# Patient Record
Sex: MALE | Race: BLACK OR AFRICAN AMERICAN | NOT HISPANIC OR LATINO | Employment: FULL TIME | URBAN - METROPOLITAN AREA
[De-identification: names, ages, dates, MRNs, and addresses within clinical notes are randomized per-mention and may not be internally consistent; named-entity substitution may affect disease eponyms.]

---

## 2018-02-23 ENCOUNTER — HOSPITAL ENCOUNTER (EMERGENCY)
Facility: HOSPITAL | Age: 23
Discharge: HOME | End: 2018-02-23
Attending: EMERGENCY MEDICINE

## 2018-02-23 VITALS
DIASTOLIC BLOOD PRESSURE: 65 MMHG | SYSTOLIC BLOOD PRESSURE: 107 MMHG | BODY MASS INDEX: 24.16 KG/M2 | RESPIRATION RATE: 16 BRPM | OXYGEN SATURATION: 99 % | WEIGHT: 145 LBS | TEMPERATURE: 98 F | HEART RATE: 78 BPM | HEIGHT: 65 IN

## 2018-02-23 DIAGNOSIS — Z20.2 TRICHOMONAS EXPOSURE: Primary | ICD-10-CM

## 2018-02-23 LAB
BILIRUB UR QL STRIP: NEGATIVE
CLARITY UR: ABNORMAL
COLOR UR: YELLOW
GLUCOSE UR QL STRIP: NEGATIVE
HGB UR QL STRIP: NEGATIVE
KETONES UR QL STRIP: NEGATIVE
LEUKOCYTE ESTERASE UR QL STRIP: NEGATIVE
NITRITE UR QL STRIP: NEGATIVE
PH UR STRIP: 6 [PH] (ref 5–8)
PROT UR QL STRIP: NEGATIVE
SP GR UR STRIP: 1.02 (ref 1–1.03)
URN SPEC COLLECT METH UR: ABNORMAL
UROBILINOGEN UR STRIP-ACNC: NEGATIVE EU/DL

## 2018-02-23 PROCEDURE — 25000003 PHARM REV CODE 250: Performed by: NURSE PRACTITIONER

## 2018-02-23 PROCEDURE — 96372 THER/PROPH/DIAG INJ SC/IM: CPT

## 2018-02-23 PROCEDURE — 87491 CHLMYD TRACH DNA AMP PROBE: CPT

## 2018-02-23 PROCEDURE — 81003 URINALYSIS AUTO W/O SCOPE: CPT

## 2018-02-23 PROCEDURE — 63600175 PHARM REV CODE 636 W HCPCS: Performed by: NURSE PRACTITIONER

## 2018-02-23 PROCEDURE — 99283 EMERGENCY DEPT VISIT LOW MDM: CPT | Mod: 25

## 2018-02-23 RX ORDER — CEFTRIAXONE 250 MG/1
250 INJECTION, POWDER, FOR SOLUTION INTRAMUSCULAR; INTRAVENOUS
Status: COMPLETED | OUTPATIENT
Start: 2018-02-23 | End: 2018-02-23

## 2018-02-23 RX ORDER — AZITHROMYCIN 250 MG/1
1000 TABLET, FILM COATED ORAL
Status: COMPLETED | OUTPATIENT
Start: 2018-02-23 | End: 2018-02-23

## 2018-02-23 RX ORDER — METRONIDAZOLE 500 MG/1
2000 TABLET ORAL
Status: COMPLETED | OUTPATIENT
Start: 2018-02-23 | End: 2018-02-23

## 2018-02-23 RX ORDER — ONDANSETRON 4 MG/1
4 TABLET, ORALLY DISINTEGRATING ORAL
Status: COMPLETED | OUTPATIENT
Start: 2018-02-23 | End: 2018-02-23

## 2018-02-23 RX ADMIN — METRONIDAZOLE 2000 MG: 500 TABLET ORAL at 10:02

## 2018-02-23 RX ADMIN — AZITHROMYCIN 1000 MG: 250 TABLET, FILM COATED ORAL at 10:02

## 2018-02-23 RX ADMIN — CEFTRIAXONE SODIUM 250 MG: 250 INJECTION, POWDER, FOR SOLUTION INTRAMUSCULAR; INTRAVENOUS at 10:02

## 2018-02-23 RX ADMIN — ONDANSETRON 4 MG: 4 TABLET, ORALLY DISINTEGRATING ORAL at 10:02

## 2018-02-24 NOTE — DISCHARGE INSTRUCTIONS
Please return to the ED for any new or worsening symptoms: Painful urination, painful lesions, loss of consciousness or any other concerns. Please follow up with primary care within in the week. You may also call [phone] for the [facility] same day appointment line.

## 2018-02-24 NOTE — ED PROVIDER NOTES
Encounter Date: 2/23/2018    SCRIBE #1 NOTE: I, Rahel Karrie, am scribing for, and in the presence of,  Tracy Reyes NP. I have scribed the following portions of the note - Other sections scribed: HPI and ROS.       History     Chief Complaint   Patient presents with    Exposure to STD     Pt stated he contacted STD from a partner. Wants to get himself checked. Denies symptoms     CC: Exposure to STD    HPI: The pt is a 22 y.o. M with a PMHx of asthma as a child and circumcision who presents to the ED for evaluation of exposure to STD. Pt reports that his girlfriend's urine recently came back positive for trichomoniasis and he wants to get testing and treatment done. Pt reports that he has had recent unprotected sex w/ girlfiend. He denies any hx of STDs as well as fever, chills, penile discharge, dysuria, and other associated symptoms.       The history is provided by the patient. No  was used.     Review of patient's allergies indicates:  No Known Allergies  Past Medical History:   Diagnosis Date    Asthma      Past Surgical History:   Procedure Laterality Date    TONSILLECTOMY       Family History   Problem Relation Age of Onset    Family history unknown: Yes     Social History   Substance Use Topics    Smoking status: Current Every Day Smoker     Types: Cigarettes    Smokeless tobacco: Never Used    Alcohol use Yes     Review of Systems   Constitutional: Negative for chills and fever.   HENT: Negative.    Eyes: Negative.    Respiratory: Negative.  Negative for cough.    Cardiovascular: Negative.    Gastrointestinal: Negative.  Negative for diarrhea, nausea and vomiting.   Genitourinary: Negative for discharge and dysuria.   Musculoskeletal: Negative.  Negative for joint swelling.   Skin: Negative.  Negative for rash.   Neurological: Negative.        Physical Exam     Initial Vitals [02/23/18 2120]   BP Pulse Resp Temp SpO2   128/73 63 16 98.1 °F (36.7 °C) 99 %      MAP       91.33          Physical Exam    Constitutional: Vital signs are normal. He appears well-developed and well-nourished.  Non-toxic appearance.   Eyes: EOM are normal.   Neck: Full passive range of motion without pain. Neck supple. No neck rigidity.   Cardiovascular: Normal rate, S1 normal, S2 normal and normal heart sounds. Exam reveals no gallop.    No murmur heard.  Pulmonary/Chest: Effort normal and breath sounds normal. No tachypnea. He has no decreased breath sounds. He has no wheezes. He has no rhonchi. He has no rales.   Genitourinary:   Genitourinary Comments: Deferred (asymptomatic)   Neurological: He is alert and oriented to person, place, and time. GCS eye subscore is 4. GCS verbal subscore is 5. GCS motor subscore is 6.   Skin: Skin is warm, dry and intact. No rash noted.   Psychiatric: He has a normal mood and affect.         ED Course   Procedures  Labs Reviewed   URINALYSIS - Abnormal; Notable for the following:        Result Value    Appearance, UA Hazy (*)     All other components within normal limits   C. TRACHOMATIS/N. GONORRHOEAE BY AMP DNA             Medical Decision Making:   ED Management:  This is a 22-year-old male who presents to the ED with complaints of Trichomonas exposure.  He is asymptomatic, afebrile and well-appearing.  Urinalysis unremarkable.  GC/Chlamydia pending.  Patient given azithromycin, Rocephin and Flagyl in the ED.  No indication for further testing.  Discharged home with instructions for supportive care and follow-up.  Return precautions given.  Case discussed with Dr. Núñez, who agrees with plan.            Scribe Attestation:   Scribe #1: I performed the above scribed service and the documentation accurately describes the services I performed. I attest to the accuracy of the note.    Attending Attestation:           Physician Attestation for Scribe:  Physician Attestation Statement for Scribe #1: I, Tracy Reyes NP, reviewed documentation, as scribed by Rahel Yoon in my presence,  and it is both accurate and complete.                    Clinical Impression:   The encounter diagnosis was Trichomonas exposure.    Disposition:   Disposition: Discharged  Condition: Stable                        Tracy Reyes NP  02/23/18 4007

## 2018-02-24 NOTE — ED TRIAGE NOTES
Pt states that he had sexual intercourse with a female a few days ago and denies any symptoms at this time.  Pt states that he did use a condom but wants to be checked for safe measure.

## 2018-02-25 LAB
C TRACH DNA SPEC QL NAA+PROBE: NOT DETECTED
N GONORRHOEA DNA SPEC QL NAA+PROBE: NOT DETECTED

## 2019-04-29 ENCOUNTER — HOSPITAL ENCOUNTER (EMERGENCY)
Facility: HOSPITAL | Age: 24
Discharge: HOME | End: 2019-04-29
Attending: EMERGENCY MEDICINE
Payer: MEDICAID

## 2019-04-29 VITALS
WEIGHT: 158 LBS | OXYGEN SATURATION: 98 % | DIASTOLIC BLOOD PRESSURE: 62 MMHG | TEMPERATURE: 99 F | HEART RATE: 85 BPM | HEIGHT: 66 IN | RESPIRATION RATE: 16 BRPM | BODY MASS INDEX: 25.39 KG/M2 | SYSTOLIC BLOOD PRESSURE: 121 MMHG

## 2019-04-29 DIAGNOSIS — Z71.1 FEARED COMPLAINT WITHOUT DIAGNOSIS: Primary | ICD-10-CM

## 2019-04-29 PROCEDURE — 99282 EMERGENCY DEPT VISIT SF MDM: CPT

## 2019-04-30 NOTE — ED PROVIDER NOTES
"Encounter Date: 4/29/2019       History     Chief Complaint   Patient presents with    Hand/foot swelling     "My right hand and right foot have been swelling, then going down for the past month."     Chief complaint:  Right hand and foot swelling    History of present illness:  Patient is a 23-year-old male who states that for 1 month he has had intermittent swelling of the right hand and foot that is generally painless although he reports pain currently is a 6/10.  He reports that currently there is no swelling present.  He denies alleviating factors but states that work is aggravating to the issue as he is often lifting things with the right hand and walking on the foot.  He denies radiating pain or that the pain is worse during the morning night or any particular time of day.  He denies fever, chills and all other complaints.    The history is provided by the patient and medical records. No  was used.     Review of patient's allergies indicates:   Allergen Reactions    Shellfish containing products Swelling     Past Medical History:   Diagnosis Date    Asthma      Past Surgical History:   Procedure Laterality Date    TONSILLECTOMY       Family History   Family history unknown: Yes     Social History     Tobacco Use    Smoking status: Current Every Day Smoker     Types: Cigarettes    Smokeless tobacco: Never Used   Substance Use Topics    Alcohol use: Yes    Drug use: Yes     Types: Marijuana     Review of Systems   Constitutional: Negative for appetite change, chills, diaphoresis, fatigue and fever.   HENT: Negative for congestion, ear discharge, ear pain, postnasal drip, rhinorrhea, sinus pressure, sneezing, sore throat and voice change.    Eyes: Negative for discharge, itching and visual disturbance.   Respiratory: Negative for cough, shortness of breath and wheezing.    Cardiovascular: Negative for chest pain, palpitations and leg swelling.   Gastrointestinal: Negative for " abdominal pain, nausea and vomiting.   Endocrine: Negative for polydipsia, polyphagia and polyuria.   Genitourinary: Negative for difficulty urinating, discharge, dysuria, frequency, hematuria, penile pain, penile swelling and urgency.   Musculoskeletal: Negative for arthralgias and myalgias.   Skin: Negative for rash and wound.   Neurological: Negative for dizziness, seizures, syncope and weakness.   Hematological: Negative for adenopathy. Does not bruise/bleed easily.   Psychiatric/Behavioral: Negative for agitation and self-injury. The patient is not nervous/anxious.        Physical Exam     Initial Vitals [04/29/19 1940]   BP Pulse Resp Temp SpO2   121/62 85 16 98.9 °F (37.2 °C) 98 %      MAP       --         Physical Exam    Nursing note and vitals reviewed.  Constitutional: He appears well-developed and well-nourished. He is not diaphoretic. No distress.   HENT:   Head: Normocephalic and atraumatic.   Right Ear: External ear normal.   Left Ear: External ear normal.   Nose: Nose normal.   Eyes: Pupils are equal, round, and reactive to light. Right eye exhibits no discharge. Left eye exhibits no discharge. No scleral icterus.   Neck: Normal range of motion.   Pulmonary/Chest: No respiratory distress.   Abdominal: He exhibits no distension.   Musculoskeletal: Normal range of motion.        Right hand: Normal.        Right foot: Normal.   Neurological: He is alert and oriented to person, place, and time.   Skin: Skin is dry. Capillary refill takes less than 2 seconds.         ED Course   Procedures  Labs Reviewed - No data to display       Imaging Results    None                APC / Resident Notes:   Initial assessment:  23-year-old male who reports right hand and foot swelling that is not present at this time.  He initially reported that it was painless but reports a 6/10 severity of pain.     Differential diagnosis includes CHF, rheumatoid or other autoimmune issue, infection    I offered the patient blood work  to check CBC, chemistry, ESR and CRP.  Patient declined this stating that he did not want to wait.  He would prefer to follow up with his primary care provider or return for any worsening or changes in condition.    Care of the patient discussed with Dr. AMANDO Augustine who agreed with the assessment and plan.                  Clinical Impression:       ICD-10-CM ICD-9-CM   1. Feared complaint without diagnosis Z71.1 V65.5         Disposition:   Disposition: Discharged  Condition: Stable                        Sachin Swartz, KIANNA  04/30/19 0136

## 2019-04-30 NOTE — ED TRIAGE NOTES
Pt. Reports right hand swollen and right foot swollen for the past month and a half. Pt. States his hand and foot ar not swollen at the present moment.

## 2019-04-30 NOTE — DISCHARGE INSTRUCTIONS
Return to the emergency department when hand or foot swells again or if there is pain, warmth or drainage . Return to the Emergency department for any worsening or failure to improve, otherwise follow up with your primary care provider.

## 2019-08-07 ENCOUNTER — HOSPITAL ENCOUNTER (EMERGENCY)
Facility: HOSPITAL | Age: 24
Discharge: HOME | End: 2019-08-07
Attending: EMERGENCY MEDICINE
Payer: MEDICAID

## 2019-08-07 VITALS
WEIGHT: 152 LBS | OXYGEN SATURATION: 98 % | SYSTOLIC BLOOD PRESSURE: 104 MMHG | RESPIRATION RATE: 16 BRPM | BODY MASS INDEX: 23.04 KG/M2 | TEMPERATURE: 98 F | DIASTOLIC BLOOD PRESSURE: 65 MMHG | HEIGHT: 68 IN | HEART RATE: 56 BPM

## 2019-08-07 DIAGNOSIS — S61.215A LACERATION OF LEFT RING FINGER WITHOUT FOREIGN BODY WITHOUT DAMAGE TO NAIL, INITIAL ENCOUNTER: Primary | ICD-10-CM

## 2019-08-07 PROCEDURE — 12002 RPR S/N/AX/GEN/TRNK2.6-7.5CM: CPT | Mod: F3

## 2019-08-07 PROCEDURE — 63600175 PHARM REV CODE 636 W HCPCS: Performed by: NURSE PRACTITIONER

## 2019-08-07 PROCEDURE — 25000003 PHARM REV CODE 250: Performed by: NURSE PRACTITIONER

## 2019-08-07 PROCEDURE — 99284 EMERGENCY DEPT VISIT MOD MDM: CPT | Mod: 25

## 2019-08-07 PROCEDURE — 90471 IMMUNIZATION ADMIN: CPT | Performed by: NURSE PRACTITIONER

## 2019-08-07 PROCEDURE — 90715 TDAP VACCINE 7 YRS/> IM: CPT | Performed by: NURSE PRACTITIONER

## 2019-08-07 RX ORDER — MAG HYDROX/ALUMINUM HYD/SIMETH 200-200-20
30 SUSPENSION, ORAL (FINAL DOSE FORM) ORAL
Status: COMPLETED | OUTPATIENT
Start: 2019-08-07 | End: 2019-08-07

## 2019-08-07 RX ORDER — IBUPROFEN 400 MG/1
800 TABLET ORAL
Status: COMPLETED | OUTPATIENT
Start: 2019-08-07 | End: 2019-08-07

## 2019-08-07 RX ORDER — LIDOCAINE HYDROCHLORIDE 10 MG/ML
10 INJECTION INFILTRATION; PERINEURAL
Status: COMPLETED | OUTPATIENT
Start: 2019-08-07 | End: 2019-08-07

## 2019-08-07 RX ADMIN — IBUPROFEN 800 MG: 400 TABLET, FILM COATED ORAL at 04:08

## 2019-08-07 RX ADMIN — CLOSTRIDIUM TETANI TOXOID ANTIGEN (FORMALDEHYDE INACTIVATED), CORYNEBACTERIUM DIPHTHERIAE TOXOID ANTIGEN (FORMALDEHYDE INACTIVATED), BORDETELLA PERTUSSIS TOXOID ANTIGEN (GLUTARALDEHYDE INACTIVATED), BORDETELLA PERTUSSIS FILAMENTOUS HEMAGGLUTININ ANTIGEN (FORMALDEHYDE INACTIVATED), BORDETELLA PERTUSSIS PERTACTIN ANTIGEN, AND BORDETELLA PERTUSSIS FIMBRIAE 2/3 ANTIGEN 0.5 ML: 5; 2; 2.5; 5; 3; 5 INJECTION, SUSPENSION INTRAMUSCULAR at 04:08

## 2019-08-07 RX ADMIN — ALUMINUM HYDROXIDE, MAGNESIUM HYDROXIDE, AND SIMETHICONE 30 ML: 200; 200; 20 SUSPENSION ORAL at 04:08

## 2019-08-07 RX ADMIN — BACITRACIN, NEOMYCIN, POLYMYXIN B 2 EACH: 400; 3.5; 5 OINTMENT TOPICAL at 04:08

## 2019-08-07 RX ADMIN — LIDOCAINE HYDROCHLORIDE 10 ML: 10 INJECTION, SOLUTION INFILTRATION; PERINEURAL at 04:08

## 2019-08-07 NOTE — ED PROVIDER NOTES
Encounter Date: 8/7/2019    SCRIBE #1 NOTE: I, Felisa Bae, am scribing for, and in the presence of,  ROBIN Clark. I have scribed the following portions of the note - Other sections scribed: HPI, ROS.       History     Chief Complaint   Patient presents with    Laceration     Laceration to left fourth finger yesterday. Pt appears drowsy. VSS. Answers all questions appropriately. Follows commands      CC: Laceration    HPI: This 24 y.o male presents to the ED for an evaluation for a laceration to the left ring finger that occurred on yesterday evening.  Patient reports moving furniture, when he mistakenly cut his finger on the jagged edge of a table top.  He reports having pain to the left ring finger near the laceration that is worse with movement.  He states he attempted to be evaluated last night at this facility, but reports he left due to the extensive wait time.  He currently denies fever, finger swelling, extremity numbness, extremity weakness, wrist pain, hand pain, or any other associated symptoms.  No prior tx.  No alleviating factors.  He reports his last tetanus vaccination being greater than 10 years.     The history is provided by the patient. No  was used.     Review of patient's allergies indicates:   Allergen Reactions    Shellfish containing products Swelling     Past Medical History:   Diagnosis Date    Asthma      Past Surgical History:   Procedure Laterality Date    TONSILLECTOMY       Family History   Family history unknown: Yes     Social History     Tobacco Use    Smoking status: Current Every Day Smoker     Packs/day: 1.00     Types: Cigarettes    Smokeless tobacco: Never Used   Substance Use Topics    Alcohol use: Yes    Drug use: Yes     Types: Marijuana     Comment: reports he is currently on probation     Review of Systems   Constitutional: Negative for chills and fever.   HENT: Negative for congestion and sore throat.    Eyes: Negative for visual  disturbance.   Respiratory: Negative for cough and shortness of breath.    Cardiovascular: Negative for chest pain.   Gastrointestinal: Negative for abdominal pain, nausea and vomiting.   Genitourinary: Negative for dysuria.   Musculoskeletal: Positive for arthralgias (left ring finger). Negative for joint swelling.   Skin: Positive for wound. Negative for rash.   Neurological: Negative for weakness, numbness and headaches.       Physical Exam     Initial Vitals [08/07/19 1514]   BP Pulse Resp Temp SpO2   (!) 106/48 70 18 97.6 °F (36.4 °C) 99 %      MAP       --         Physical Exam    Nursing note and vitals reviewed.  Constitutional: Vital signs are normal. He appears well-developed and well-nourished. He is not diaphoretic. He is active and cooperative.  Non-toxic appearance. No distress.   Musculoskeletal:        Hands:  Neurological: He is alert.   Skin: Skin is warm and dry. Capillary refill takes less than 2 seconds.   Psychiatric: He has a normal mood and affect.         ED Course   Lac Repair  Date/Time: 8/7/2019 4:00 PM  Performed by: Mabel Diaz NP  Authorized by: Norma Dallas MD   Body area: upper extremity  Location details: left ring finger  Laceration length: 3.5 cm  Foreign bodies: no foreign bodies  Tendon involvement: none  Nerve involvement: none  Vascular damage: no  Anesthesia: local infiltration    Anesthesia:  Local Anesthetic: lidocaine 1% without epinephrine  Patient sedated: no  Preparation: Patient was prepped and draped in the usual sterile fashion.  Irrigation solution: saline  Irrigation method: syringe  Amount of cleaning: standard  Debridement: none  Degree of undermining: none  Skin closure: 4-0 nylon  Number of sutures: 7  Technique: simple  Approximation: close  Approximation difficulty: simple  Dressing: antibiotic ointment  Patient tolerance: Patient tolerated the procedure well with no immediate complications        Labs Reviewed - No data to display       Imaging  Results    None          Medical Decision Making:   ED Management:  This is an urgent evaluation of a 24-year-old male that presents to the emergency room with a laceration to his left ring finger.  The laceration was repaired without difficulty.  There are no signs of foreign body, neurovascular deficit, or infection at this time. To follow up with PMD/ER in 7-10 for suture removal.  Given wound care instructions. Return precautions.              Scribe Attestation:   Scribe #1: I performed the above scribed service and the documentation accurately describes the services I performed. I attest to the accuracy of the note.    I, Mabel Diaz, personally performed the services described in this documentation. All medical record entries made by the scribe were at my direction and in my presence.  I have reviewed the chart and agree that the record reflects my personal performance and is accurate and complete.           Clinical Impression:       ICD-10-CM ICD-9-CM   1. Laceration of left ring finger without foreign body without damage to nail, initial encounter S61.215A 883.0         Disposition:   Disposition: Discharged  Condition: Stable                        Mabel Diaz NP  08/07/19 0389

## 2019-08-07 NOTE — DISCHARGE INSTRUCTIONS
Please keep the wound clean and dry.  Wash normally with soap and water, taking care not to disrupt the sutures.  Apply a thin layer of antibiotic ointment over the area (bacitracin, neosporin).  You can use a moist cotton Q-tip to push away any crusting that builds up around the sutures.  Keep a clean gauze dressing over the wound and change when soiled, or at least once daily.    Monitor for any signs of infection: redness, swelling, pus or foul discharge, or fever greater than 100.4F.    Take tylenol or ibuprofen for pain, as needed.    Follow up with your primary care doctor or return to the ER in 7-10 days for wound check and suture removal.    Return to the ER for any new or worsening symptoms or concerns.

## 2019-08-07 NOTE — ED TRIAGE NOTES
Pt reports he cut his left 4th finger on either plastic or glass last night, is unsure what he cut finger on.  Reports he cut his finger last night after he got off work.  Pt reports being seen in this ED this morning at 4am.  Pt reports he left AMA and wrapped finger up.  Pt speech is currently slurred and slow and pt appears drowsy at this time.  Pt reports his finger bled a lot when it was cut.

## 2021-07-20 ENCOUNTER — LAB VISIT (OUTPATIENT)
Dept: PRIMARY CARE CLINIC | Facility: OTHER | Age: 26
End: 2021-07-20
Payer: OTHER GOVERNMENT

## 2021-07-20 DIAGNOSIS — Z20.822 ENCOUNTER FOR LABORATORY TESTING FOR COVID-19 VIRUS: ICD-10-CM

## 2021-07-20 PROCEDURE — U0003 INFECTIOUS AGENT DETECTION BY NUCLEIC ACID (DNA OR RNA); SEVERE ACUTE RESPIRATORY SYNDROME CORONAVIRUS 2 (SARS-COV-2) (CORONAVIRUS DISEASE [COVID-19]), AMPLIFIED PROBE TECHNIQUE, MAKING USE OF HIGH THROUGHPUT TECHNOLOGIES AS DESCRIBED BY CMS-2020-01-R: HCPCS

## 2021-07-22 LAB
SARS-COV-2 RNA RESP QL NAA+PROBE: NOT DETECTED
SARS-COV-2- CYCLE NUMBER: -1

## 2021-08-17 ENCOUNTER — LAB VISIT (OUTPATIENT)
Dept: PRIMARY CARE CLINIC | Facility: OTHER | Age: 26
End: 2021-08-17
Payer: OTHER GOVERNMENT

## 2021-08-17 DIAGNOSIS — Z20.822 ENCOUNTER FOR LABORATORY TESTING FOR COVID-19 VIRUS: ICD-10-CM

## 2021-08-17 PROCEDURE — U0003 INFECTIOUS AGENT DETECTION BY NUCLEIC ACID (DNA OR RNA); SEVERE ACUTE RESPIRATORY SYNDROME CORONAVIRUS 2 (SARS-COV-2) (CORONAVIRUS DISEASE [COVID-19]), AMPLIFIED PROBE TECHNIQUE, MAKING USE OF HIGH THROUGHPUT TECHNOLOGIES AS DESCRIBED BY CMS-2020-01-R: HCPCS | Performed by: INTERNAL MEDICINE

## 2021-08-19 LAB
SARS-COV-2 RNA RESP QL NAA+PROBE: NOT DETECTED
SARS-COV-2- CYCLE NUMBER: -1

## 2022-05-01 ENCOUNTER — HOSPITAL ENCOUNTER (EMERGENCY)
Facility: HOSPITAL | Age: 27
Discharge: HOME | End: 2022-05-01
Attending: STUDENT IN AN ORGANIZED HEALTH CARE EDUCATION/TRAINING PROGRAM

## 2022-05-01 VITALS
HEART RATE: 75 BPM | SYSTOLIC BLOOD PRESSURE: 112 MMHG | BODY MASS INDEX: 23.3 KG/M2 | WEIGHT: 145 LBS | TEMPERATURE: 98 F | OXYGEN SATURATION: 99 % | RESPIRATION RATE: 18 BRPM | DIASTOLIC BLOOD PRESSURE: 58 MMHG | HEIGHT: 66 IN

## 2022-05-01 DIAGNOSIS — Z20.822 CLOSE EXPOSURE TO COVID-19 VIRUS: Primary | ICD-10-CM

## 2022-05-01 PROCEDURE — U0003 INFECTIOUS AGENT DETECTION BY NUCLEIC ACID (DNA OR RNA); SEVERE ACUTE RESPIRATORY SYNDROME CORONAVIRUS 2 (SARS-COV-2) (CORONAVIRUS DISEASE [COVID-19]), AMPLIFIED PROBE TECHNIQUE, MAKING USE OF HIGH THROUGHPUT TECHNOLOGIES AS DESCRIBED BY CMS-2020-01-R: HCPCS | Performed by: EMERGENCY MEDICINE

## 2022-05-01 PROCEDURE — 99283 EMERGENCY DEPT VISIT LOW MDM: CPT

## 2022-05-01 PROCEDURE — U0005 INFEC AGEN DETEC AMPLI PROBE: HCPCS | Performed by: EMERGENCY MEDICINE

## 2022-05-01 NOTE — Clinical Note
"Hussein"Everton Mcdonald was seen and treated in our emergency department on 5/1/2022.     COVID-19 is present in our communities across the state. There is limited testing for COVID at this time, so not all patients can be tested. In this situation, your employee meets the following criteria:    Hussein Mcdonald has met the criteria for COVID-19 testing based upon symptoms, travel, and/or potential exposure. The test has been completed and is pending results at this time. During this time the employee is not able to work and should be quarantined per the Centers for Disease Control timelines.     If you have any questions or concerns, or if I can be of further assistance, please do not hesitate to contact me.    Sincerely,             Christian Galvin PA-C"

## 2022-05-02 LAB — SARS-COV-2 RNA RESP QL NAA+PROBE: NOT DETECTED

## 2022-05-04 NOTE — ED PROVIDER NOTES
Encounter Date: 5/1/2022       History     Chief Complaint   Patient presents with    flu like symptoms    covid-19 test     Patient presents to the ED via pov with family. He reports that his daughter has covid and he has been having elvis congestion, a cough, and headaches x 3 days.     27yo M with chief complaint need for COVID testing. Daughter tested positive for COVID yesterday evening. He does admit to mild sinus pressure x 3d. No fever. No cough. No other complaints.         Review of patient's allergies indicates:   Allergen Reactions    Shellfish containing products Swelling     Past Medical History:   Diagnosis Date    Asthma      Past Surgical History:   Procedure Laterality Date    TONSILLECTOMY       Family History   Family history unknown: Yes     Social History     Tobacco Use    Smoking status: Current Every Day Smoker     Packs/day: 1.00     Types: Cigarettes    Smokeless tobacco: Never Used   Substance Use Topics    Alcohol use: Yes    Drug use: Yes     Types: Marijuana     Comment: reports he is currently on probation     Review of Systems   Constitutional: Negative for fever.   HENT: Positive for sinus pressure.    Respiratory: Negative for cough.    Musculoskeletal: Negative for myalgias, neck pain and neck stiffness.       Physical Exam     Initial Vitals [05/01/22 1854]   BP Pulse Resp Temp SpO2   113/64 72 16 98.2 °F (36.8 °C) 98 %      MAP       --         Physical Exam    Nursing note and vitals reviewed.  Constitutional: He appears well-developed and well-nourished. He is not diaphoretic. No distress.   HENT:   Head: Normocephalic and atraumatic.   Neck: Neck supple.   Normal range of motion.  Pulmonary/Chest: No respiratory distress.   Musculoskeletal:      Cervical back: Normal range of motion and neck supple.     Neurological: He is alert and oriented to person, place, and time. GCS score is 15. GCS eye subscore is 4. GCS verbal subscore is 5. GCS motor subscore is 6.   Skin:  Skin is warm. Capillary refill takes less than 2 seconds.   Psychiatric: He has a normal mood and affect. Thought content normal.         ED Course   Procedures  Labs Reviewed   SARS-COV-2 (COVID-19) QUALITATIVE PCR    Narrative:     Is this needed for pre-procedure or pre-op testing?->No          Imaging Results    None          Medications - No data to display  Medical Decision Making:   Differential Diagnosis:   Viral illness, sinusitis, encounter for COVID testing  ED Management:  Discussed influenza and other viruses, but pt and family only wish to be tested for COVID. Advised to treat sinus pressure symptomatically or return for any worsening. Advised f/u with local PCP for any persistent symptoms.                       Clinical Impression:   Final diagnoses:  [Z20.822] Close exposure to COVID-19 virus (Primary)          ED Disposition Condition    Discharge Stable        ED Prescriptions     None        Follow-up Information     Follow up With Specialties Details Why Contact Info    [facility] [place]  Schedule an appointment as soon as possible for a visit  To establish primary care physician, for reevaluation [address] [place] [address]  [phone]      [facility]  Schedule an appointment as soon as possible for a visit  To establish primary care physician, For reevaluation [address] [place] [address]  [phone]             Christian Galvin PA-C  05/03/22 0615

## 2022-05-30 ENCOUNTER — HOSPITAL ENCOUNTER (EMERGENCY)
Facility: HOSPITAL | Age: 27
Discharge: HOME | End: 2022-05-30
Attending: EMERGENCY MEDICINE

## 2022-05-30 VITALS
HEART RATE: 71 BPM | OXYGEN SATURATION: 98 % | RESPIRATION RATE: 18 BRPM | DIASTOLIC BLOOD PRESSURE: 62 MMHG | WEIGHT: 148 LBS | HEIGHT: 66 IN | BODY MASS INDEX: 23.78 KG/M2 | SYSTOLIC BLOOD PRESSURE: 105 MMHG | TEMPERATURE: 98 F

## 2022-05-30 DIAGNOSIS — R51.9 ACUTE NONINTRACTABLE HEADACHE, UNSPECIFIED HEADACHE TYPE: Primary | ICD-10-CM

## 2022-05-30 PROCEDURE — U0003 INFECTIOUS AGENT DETECTION BY NUCLEIC ACID (DNA OR RNA); SEVERE ACUTE RESPIRATORY SYNDROME CORONAVIRUS 2 (SARS-COV-2) (CORONAVIRUS DISEASE [COVID-19]), AMPLIFIED PROBE TECHNIQUE, MAKING USE OF HIGH THROUGHPUT TECHNOLOGIES AS DESCRIBED BY CMS-2020-01-R: HCPCS | Performed by: PHYSICIAN ASSISTANT

## 2022-05-30 PROCEDURE — 99283 EMERGENCY DEPT VISIT LOW MDM: CPT

## 2022-05-30 PROCEDURE — U0005 INFEC AGEN DETEC AMPLI PROBE: HCPCS | Performed by: PHYSICIAN ASSISTANT

## 2022-05-30 PROCEDURE — 25000003 PHARM REV CODE 250: Performed by: PHYSICIAN ASSISTANT

## 2022-05-30 RX ORDER — IBUPROFEN 400 MG/1
800 TABLET ORAL
Status: COMPLETED | OUTPATIENT
Start: 2022-05-30 | End: 2022-05-30

## 2022-05-30 RX ORDER — ACETAMINOPHEN 500 MG
1000 TABLET ORAL
Status: COMPLETED | OUTPATIENT
Start: 2022-05-30 | End: 2022-05-30

## 2022-05-30 RX ADMIN — IBUPROFEN 800 MG: 400 TABLET, FILM COATED ORAL at 09:05

## 2022-05-30 RX ADMIN — ACETAMINOPHEN 1000 MG: 500 TABLET ORAL at 09:05

## 2022-05-30 NOTE — Clinical Note
"Hussein Mottlynda Mcdonald was seen and treated in our emergency department on 5/30/2022.  He may return to work on 05/31/2022.       If you have any questions or concerns, please don't hesitate to call.      Alayna Holdsworth, PA-C"

## 2022-05-31 LAB — SARS-COV-2 RNA RESP QL NAA+PROBE: NOT DETECTED

## 2022-05-31 NOTE — ED PROVIDER NOTES
Encounter Date: 5/30/2022       History     Chief Complaint   Patient presents with    Headache     Patient presents to ED secondary to headache beginning last night. Frontal. Denies accompanying symptoms. Last dose of ibuprofen taken at 0800 with minimal relief. Also endorse positive covid exposure. Pt checked in with  with same complaints.      26-year-old male with past medical history of asthma presents to the ED for headache.  Patient states that it started yesterday.  He states it is in his frontal region.  He describes it as throbbing that comes and goes, he rates it a 6/10.  He states he has tried ibuprofen which does help, but the headache returns once ibuprofen was off.  Patient denies visual issues, chest pain, shortness of breath, visual issues, sore throat, rhinorrhea, congestion, fevers, chills, sweats, body aches, abdominal pain, nausea, vomiting, diarrhea, constipation, and weakness.  Patient states his manager was COVID positive 3 days ago.  Patient denies recent travel.  Patient states he is only here for COVID test.        Review of patient's allergies indicates:   Allergen Reactions    Shellfish containing products Swelling     Past Medical History:   Diagnosis Date    Asthma      Past Surgical History:   Procedure Laterality Date    TONSILLECTOMY       Family History   Family history unknown: Yes     Social History     Tobacco Use    Smoking status: Current Every Day Smoker     Packs/day: 1.00     Types: Cigarettes    Smokeless tobacco: Never Used   Substance Use Topics    Alcohol use: Yes    Drug use: Yes     Types: Marijuana     Comment: reports he is currently on probation     Review of Systems   Constitutional: Negative for chills, diaphoresis, fatigue and fever.   HENT: Negative for congestion, rhinorrhea, sinus pressure and sore throat.    Eyes: Negative for discharge and visual disturbance.   Respiratory: Negative for cough and shortness of breath.    Cardiovascular:  Negative for chest pain.   Gastrointestinal: Negative for abdominal pain, constipation, diarrhea, nausea and vomiting.   Genitourinary: Negative for difficulty urinating.   Musculoskeletal: Negative for arthralgias, back pain, myalgias and neck pain.   Skin: Negative for rash.   Neurological: Positive for headaches. Negative for dizziness, weakness, light-headedness and numbness.       Physical Exam     Initial Vitals [05/30/22 2016]   BP Pulse Resp Temp SpO2   (!) 108/59 95 18 98 °F (36.7 °C) 98 %      MAP       --         Physical Exam    Nursing note and vitals reviewed.  Constitutional: Vital signs are normal. He appears well-developed and well-nourished. He is not diaphoretic. He is active. No distress.   HENT:   Head: Normocephalic and atraumatic.   Right Ear: External ear normal.   Left Ear: External ear normal.   Nose: Nose normal. Right sinus exhibits no maxillary sinus tenderness and no frontal sinus tenderness. Left sinus exhibits no maxillary sinus tenderness and no frontal sinus tenderness.   Mouth/Throat: Uvula is midline, oropharynx is clear and moist and mucous membranes are normal.   Eyes: Conjunctivae, EOM and lids are normal. Pupils are equal, round, and reactive to light. Right eye exhibits no discharge. Left eye exhibits no discharge. No scleral icterus.   Neck:   Normal range of motion.  Cardiovascular: Normal rate and regular rhythm.   Pulmonary/Chest: Effort normal and breath sounds normal. No respiratory distress.   Abdominal: Abdomen is soft. He exhibits no distension. There is no abdominal tenderness.   Musculoskeletal:         General: Normal range of motion.      Cervical back: Normal range of motion.     Neurological: He is alert and oriented to person, place, and time.   Skin: Skin is dry. Capillary refill takes less than 2 seconds.         ED Course   Procedures  Labs Reviewed   SARS-COV-2 (COVID-19) QUALITATIVE PCR          Imaging Results    None          Medications   ibuprofen  tablet 800 mg (800 mg Oral Given 5/30/22 2141)   acetaminophen tablet 1,000 mg (1,000 mg Oral Given 5/30/22 2141)     Medical Decision Making:   Initial Assessment:   26-year-old male with past medical history of asthma presents to the ED for headache.  Patient's chart and medical history reviewed.  Differential Diagnosis:   COVID  Influenza  Tension headache  Migraine  ED Management:  Patient's vitals reviewed.  He is afebrile, no respiratory distress, and nontoxic-appearing in the ED.  Patient's physical was unremarkable.  He is neurovascularly intact.  He was given ibuprofen and Tylenol for his headache.  Patient was swabbed for COVID.  Patient refused influenza swab.  Patient states he just wanted a COVID tested and to be discharged.  Discussed with patient results wont be back in 2 or 3 days, and he will be called with any abnormal results.  Discussed with patient he can use ibuprofen and Tylenol as needed for his headache.  Strict return precautions were given for worsening symptoms, he verbalized understanding.  Patient stable for discharge.                         Clinical Impression:   Final diagnoses:  [R51.9] Acute nonintractable headache, unspecified headache type (Primary)          ED Disposition Condition    Discharge Stable        ED Prescriptions     None        Follow-up Information     Follow up With Specialties Details Why Contact Info    [facility] [place]    [address] [place] [address]  [phone]             Alayna Holdsworth, PA-C  05/30/22 8418

## 2022-05-31 NOTE — DISCHARGE INSTRUCTIONS
Thank you for coming to our Emergency Department today. It is important to remember that some problems are difficult to diagnose and may not be found during your first visit. Be sure to follow up with your primary care doctor and review any labs/imaging that was performed with them. If you do not have a primary care doctor, you may contact the one listed on your discharge paperwork or you may also call the [facility] Appointment Desk at [phone] to schedule an appointment with one.     All medications may potentially have side effects and it is impossible to predict which medications may give you side effects. If you feel that you are having a negative effect of any medication you should immediately stop taking them and seek medical attention.    Return to the ER with any questions/concerns, new/concerning symptoms, worsening or failure to improve. Do not drive or make any important decisions for 24 hours if you have received any pain medications, sedatives or mood altering drugs during your ER visit.

## 2022-05-31 NOTE — FIRST PROVIDER EVALUATION
Emergency Department TeleTriage Encounter Note      CHIEF COMPLAINT    Chief Complaint   Patient presents with    Headache     Patient presents to ED secondary to headache beginning last night. Frontal. Denies accompanying symptoms. Last dose of ibuprofen taken at 0800 with minimal relief. Also endorse positive covid exposure. Pt checked in with  with same complaints.        VITAL SIGNS   Initial Vitals [05/30/22 2016]   BP Pulse Resp Temp SpO2   (!) 108/59 95 18 98 °F (36.7 °C) 98 %      MAP       --            ALLERGIES    Review of patient's allergies indicates:   Allergen Reactions    Shellfish containing products Swelling       PROVIDER TRIAGE NOTE  26-year-old male to the ER for evaluation of a headache.  Patient also reports COVID exposure.  He does not appear to be toxic ill or distressed.  He took ibuprofen this morning, 400 mg without relief.      ORDERS  Labs Reviewed   SARS-COV-2 (COVID-19) QUALITATIVE PCR       ED Orders (720h ago, onward)    Start Ordered     Status Ordering Provider    05/30/22 2130 05/30/22 2116  ibuprofen tablet 800 mg  ED 1 Time         Ordered THAIS METZGER    05/30/22 2130 05/30/22 2116  acetaminophen tablet 1,000 mg  ED 1 Time         Ordered THAIS METZGER    05/30/22 2117 05/30/22 2116  COVID-19 Routine Screening  STAT         Ordered THAIS METZGER            Virtual Visit Note: The provider triage portion of this emergency department evaluation and documentation was performed via [employer], a HIPAA-compliant telemedicine application, in concert with a tele-presenter in the room. A face to face patient evaluation with one of my colleagues will occur once the patient is placed in an emergency department room.      DISCLAIMER: This note was prepared with [employer] voice recognition transcription software. Garbled syntax, mangled pronouns, and other bizarre constructions may be attributed to that software system.

## 2024-01-19 NOTE — DISCHARGE INSTRUCTIONS
Continue with Tylenol/ibuprofen as needed for headache, discomfort, fever.  Over-the-counter cough medication as needed.    Return to this ED if you begin with shortness of breath, if unable to treat fever, if any other problems occur.   Render Risk Assessment In Note?: no Additional Notes: Patient again declines biopsies, discuss the risk of life-threatening skin cancer, including melonoma, but patient still declines. He agreed to only biopsying lesion on his right arm. Informed refusal was signed Detail Level: Simple